# Patient Record
Sex: MALE | Race: WHITE | NOT HISPANIC OR LATINO | Employment: OTHER | URBAN - METROPOLITAN AREA
[De-identification: names, ages, dates, MRNs, and addresses within clinical notes are randomized per-mention and may not be internally consistent; named-entity substitution may affect disease eponyms.]

---

## 2017-11-08 ENCOUNTER — CONTACT LENS EXAM ESTABLISHED (OUTPATIENT)
Dept: URBAN - METROPOLITAN AREA CLINIC 35 | Facility: CLINIC | Age: 72
End: 2017-11-08

## 2017-11-08 DIAGNOSIS — H35.363: ICD-10-CM

## 2017-11-08 DIAGNOSIS — H25.13: ICD-10-CM

## 2017-11-08 PROCEDURE — 92014 COMPRE OPH EXAM EST PT 1/>: CPT

## 2017-11-08 PROCEDURE — 92310-1 LEVEL 1 CONTACT LENS MANAGEMENT

## 2017-11-08 PROCEDURE — 92250 FUNDUS PHOTOGRAPHY W/I&R: CPT

## 2017-11-08 PROCEDURE — G8428 CUR MEDS NOT DOCUMENT: HCPCS

## 2017-11-08 PROCEDURE — 1036F TOBACCO NON-USER: CPT

## 2017-11-08 PROCEDURE — 92015 DETERMINE REFRACTIVE STATE: CPT

## 2017-11-08 ASSESSMENT — TONOMETRY
OS_IOP_MMHG: 16
OD_IOP_MMHG: 18

## 2017-11-08 ASSESSMENT — VISUAL ACUITY
OS_CC: 20/30
OD_SC: J1
OS_SC: J1
OD_CC: 20/40

## 2018-03-23 NOTE — PATIENT DISCUSSION
Corneal Ulcer Counseling: The nature of the diagnosis was discussed with the patient, including the risk of endophthalmitis, corneal scar, blindness, infection, loss of the eye, eye pain, and need for surgical intervention. I have stressed the importance of staying out of any contacts. I also stressed compliance with treatment and follow-up instructions. The patient was instructed to call back and return for care immediately if symptoms worsen.

## 2018-03-23 NOTE — PATIENT DISCUSSION
LARGE CENTRAL CORNEAL ULCER OS - INJURY (DIRTY INSULATION) HAPPENED 2 DAYS AGO, APPEARS TO BE NON-HEALING.  WILL NEED FORTIFIED ANTIBIOTICS - REFER TO [facility] - DIRECTIONS GIVEN TO ER

## 2018-08-01 ENCOUNTER — EST. PATIENT EMERGENCY (OUTPATIENT)
Dept: URBAN - METROPOLITAN AREA CLINIC 35 | Facility: CLINIC | Age: 73
End: 2018-08-01

## 2018-08-01 DIAGNOSIS — H35.363: ICD-10-CM

## 2018-08-01 DIAGNOSIS — H01.006: ICD-10-CM

## 2018-08-01 DIAGNOSIS — H01.003: ICD-10-CM

## 2018-08-01 DIAGNOSIS — H00.026: ICD-10-CM

## 2018-08-01 DIAGNOSIS — H00.023: ICD-10-CM

## 2018-08-01 PROCEDURE — 92012 INTRM OPH EXAM EST PATIENT: CPT

## 2018-08-01 RX ORDER — NEOMYCIN SULFATE, POLYMYXIN B SULFATE AND DEXAMETHASONE 3.5; 10000; 1 MG/G; [USP'U]/G; MG/G: OINTMENT OPHTHALMIC EVERY EVENING

## 2018-08-01 RX ORDER — NEOMYCIN SULFATE, POLYMYXIN B SULFATE AND DEXAMETHASONE 3.5; 10000; 1 MG/ML; [USP'U]/ML; MG/ML: 1 SUSPENSION OPHTHALMIC

## 2018-08-01 ASSESSMENT — VISUAL ACUITY
OS_SC: J3
OS_SC: 20/25
OD_SC: 20/25
OD_SC: J2-

## 2018-08-01 ASSESSMENT — TONOMETRY
OS_IOP_MMHG: 12
OD_IOP_MMHG: 12

## 2019-01-03 ENCOUNTER — CONTACT LENS EXAM ESTABLISHED (OUTPATIENT)
Dept: URBAN - METROPOLITAN AREA CLINIC 35 | Facility: CLINIC | Age: 74
End: 2019-01-03

## 2019-01-03 DIAGNOSIS — H04.123: ICD-10-CM

## 2019-01-03 DIAGNOSIS — H35.363: ICD-10-CM

## 2019-01-03 DIAGNOSIS — H25.13: ICD-10-CM

## 2019-01-03 DIAGNOSIS — H02.883: ICD-10-CM

## 2019-01-03 DIAGNOSIS — H02.886: ICD-10-CM

## 2019-01-03 PROCEDURE — 92310-1 LEVEL 1 CONTACT LENS MANAGEMENT

## 2019-01-03 PROCEDURE — 92015 DETERMINE REFRACTIVE STATE: CPT

## 2019-01-03 PROCEDURE — 92014 COMPRE OPH EXAM EST PT 1/>: CPT

## 2019-01-03 ASSESSMENT — VISUAL ACUITY
OS_CC: J3-
OD_CC: J1-
OS_CC: 20/30-2
OD_CC: 20/20-1

## 2019-01-03 ASSESSMENT — TONOMETRY
OS_IOP_MMHG: 16
OD_IOP_MMHG: 16

## 2019-07-10 ENCOUNTER — REFRACTION ONLY (OUTPATIENT)
Dept: URBAN - METROPOLITAN AREA CLINIC 35 | Facility: CLINIC | Age: 74
End: 2019-07-10

## 2019-07-10 DIAGNOSIS — H01.006: ICD-10-CM

## 2019-07-10 DIAGNOSIS — H25.13: ICD-10-CM

## 2019-07-10 DIAGNOSIS — H04.123: ICD-10-CM

## 2019-07-10 DIAGNOSIS — H01.003: ICD-10-CM

## 2019-07-10 PROCEDURE — 92012 INTRM OPH EXAM EST PATIENT: CPT

## 2019-07-10 ASSESSMENT — VISUAL ACUITY
OD_CC: 20/25 W CLS
OS_CC: 20/20 W CLS

## 2020-07-09 ENCOUNTER — CONTACT LENS EXAM ESTABLISHED (OUTPATIENT)
Dept: URBAN - METROPOLITAN AREA CLINIC 35 | Facility: CLINIC | Age: 75
End: 2020-07-09

## 2020-07-09 DIAGNOSIS — H35.363: ICD-10-CM

## 2020-07-09 DIAGNOSIS — H04.123: ICD-10-CM

## 2020-07-09 DIAGNOSIS — H52.203: ICD-10-CM

## 2020-07-09 DIAGNOSIS — H52.4: ICD-10-CM

## 2020-07-09 DIAGNOSIS — H01.003: ICD-10-CM

## 2020-07-09 DIAGNOSIS — H25.13: ICD-10-CM

## 2020-07-09 DIAGNOSIS — H01.006: ICD-10-CM

## 2020-07-09 DIAGNOSIS — H52.13: ICD-10-CM

## 2020-07-09 PROCEDURE — 92250 FUNDUS PHOTOGRAPHY W/I&R: CPT

## 2020-07-09 PROCEDURE — 92015 DETERMINE REFRACTIVE STATE: CPT

## 2020-07-09 PROCEDURE — 92014 COMPRE OPH EXAM EST PT 1/>: CPT

## 2020-07-09 ASSESSMENT — TONOMETRY
OD_IOP_MMHG: 16
OS_IOP_MMHG: 15

## 2020-07-09 ASSESSMENT — KERATOMETRY
OD_K1POWER_DIOPTERS: 45.25
OS_K1POWER_DIOPTERS: 45
OS_AXISANGLE2_DEGREES: 53
OS_K2POWER_DIOPTERS: 44.5
OS_AXISANGLE_DEGREES: 143
OD_AXISANGLE2_DEGREES: 53
OD_K2POWER_DIOPTERS: 45.5
OD_AXISANGLE_DEGREES: 143

## 2020-07-09 ASSESSMENT — VISUAL ACUITY
OD_CC: 20/25 CL
OS_CC: 20/25-2 CL
OU_CC: 20/25-1 CL
OU_CC: J2 CLS

## 2021-01-07 ENCOUNTER — COSMETIC CONSULT (OUTPATIENT)
Dept: URBAN - METROPOLITAN AREA CLINIC 44 | Facility: CLINIC | Age: 76
End: 2021-01-07

## 2021-01-07 DIAGNOSIS — Z98.890: ICD-10-CM

## 2021-01-07 DIAGNOSIS — L98.8: ICD-10-CM

## 2021-01-07 PROCEDURE — 96999RH4 REVANCE RESILIENT HYALURONIC ACID 4

## 2021-01-07 PROCEDURE — 96999RH3 REVANCE RESILIENT HYALURONIC ACID 3

## 2021-01-07 PROCEDURE — 96999RH2 REVANCE RESILIENT HYALURONIC ACID 2

## 2021-01-07 PROCEDURE — 64612X XEOMIN / COSMETIC

## 2021-01-07 PROCEDURE — 99499 UNLISTED E&M SERVICE: CPT

## 2021-01-07 ASSESSMENT — KERATOMETRY
OS_AXISANGLE2_DEGREES: 53
OD_AXISANGLE2_DEGREES: 53
OS_AXISANGLE_DEGREES: 143
OS_K1POWER_DIOPTERS: 45
OD_AXISANGLE_DEGREES: 143
OS_K2POWER_DIOPTERS: 44.5
OD_K2POWER_DIOPTERS: 45.5
OD_K1POWER_DIOPTERS: 45.25

## 2021-07-14 ENCOUNTER — CONTACT LENS EXAM ESTABLISHED (OUTPATIENT)
Dept: URBAN - METROPOLITAN AREA CLINIC 35 | Facility: CLINIC | Age: 76
End: 2021-07-14

## 2021-07-14 DIAGNOSIS — Z98.890: ICD-10-CM

## 2021-07-14 DIAGNOSIS — H01.006: ICD-10-CM

## 2021-07-14 DIAGNOSIS — Z46.0: ICD-10-CM

## 2021-07-14 DIAGNOSIS — H25.13: ICD-10-CM

## 2021-07-14 DIAGNOSIS — H52.4: ICD-10-CM

## 2021-07-14 DIAGNOSIS — H52.13: ICD-10-CM

## 2021-07-14 DIAGNOSIS — H01.003: ICD-10-CM

## 2021-07-14 DIAGNOSIS — H35.363: ICD-10-CM

## 2021-07-14 PROCEDURE — 92310-1 LEVEL 1 CONTACT LENS MANAGEMENT

## 2021-07-14 PROCEDURE — 92250 FUNDUS PHOTOGRAPHY W/I&R: CPT

## 2021-07-14 PROCEDURE — 92015 DETERMINE REFRACTIVE STATE: CPT

## 2021-07-14 PROCEDURE — 92014 COMPRE OPH EXAM EST PT 1/>: CPT

## 2021-07-14 ASSESSMENT — KERATOMETRY
OS_K1POWER_DIOPTERS: 45
OD_AXISANGLE2_DEGREES: 53
OS_AXISANGLE2_DEGREES: 53
OD_K2POWER_DIOPTERS: 45.5
OD_AXISANGLE_DEGREES: 143
OS_K2POWER_DIOPTERS: 44.5
OD_K1POWER_DIOPTERS: 45.25
OS_AXISANGLE_DEGREES: 143

## 2021-07-14 ASSESSMENT — VISUAL ACUITY
OU_CC: J3 CL
OS_CC: 20/40 CL
OD_CC: J8 CL
OS_CC: J3 CL
OD_CC: 20/25 CL
OU_CC: 20/20 CL

## 2021-07-14 ASSESSMENT — TONOMETRY
OS_IOP_MMHG: 16
OD_IOP_MMHG: 15

## 2022-07-20 ENCOUNTER — COMPREHENSIVE EXAM (OUTPATIENT)
Dept: URBAN - METROPOLITAN AREA CLINIC 35 | Facility: CLINIC | Age: 77
End: 2022-07-20

## 2022-07-20 DIAGNOSIS — H35.363: ICD-10-CM

## 2022-07-20 DIAGNOSIS — H52.4: ICD-10-CM

## 2022-07-20 DIAGNOSIS — H25.13: ICD-10-CM

## 2022-07-20 PROCEDURE — 92310-1 LEVEL 1 CONTACT LENS MANAGEMENT

## 2022-07-20 PROCEDURE — 92250 FUNDUS PHOTOGRAPHY W/I&R: CPT

## 2022-07-20 PROCEDURE — 92015 DETERMINE REFRACTIVE STATE: CPT

## 2022-07-20 PROCEDURE — 92014 COMPRE OPH EXAM EST PT 1/>: CPT

## 2022-07-20 ASSESSMENT — KERATOMETRY
OD_K1POWER_DIOPTERS: 45.25
OD_AXISANGLE_DEGREES: 143
OS_AXISANGLE2_DEGREES: 53
OD_AXISANGLE2_DEGREES: 53
OD_K2POWER_DIOPTERS: 45.5
OS_K1POWER_DIOPTERS: 45
OS_K2POWER_DIOPTERS: 44.5
OS_AXISANGLE_DEGREES: 143

## 2022-07-20 ASSESSMENT — VISUAL ACUITY
OS_CC: J4
OS_CC: 20/30
OD_PH: 20/25+1
OU_CC: 20/25-1
OD_CC: J2
OD_CC: 20/40+2
OU_CC: J1

## 2022-07-20 ASSESSMENT — TONOMETRY
OD_IOP_MMHG: 15
OS_IOP_MMHG: 15

## 2023-05-10 ENCOUNTER — FOLLOW UP (OUTPATIENT)
Dept: URBAN - METROPOLITAN AREA CLINIC 39 | Facility: CLINIC | Age: 78
End: 2023-05-10

## 2023-05-10 DIAGNOSIS — Z98.890: ICD-10-CM

## 2023-05-10 DIAGNOSIS — R22.0: ICD-10-CM

## 2023-05-10 PROCEDURE — 99024 POSTOP FOLLOW-UP VISIT: CPT

## 2023-05-10 ASSESSMENT — KERATOMETRY
OS_K1POWER_DIOPTERS: 45
OD_K1POWER_DIOPTERS: 45.25
OS_K2POWER_DIOPTERS: 44.5
OD_K2POWER_DIOPTERS: 45.5
OS_AXISANGLE2_DEGREES: 53
OD_AXISANGLE_DEGREES: 143
OS_AXISANGLE_DEGREES: 143
OD_AXISANGLE2_DEGREES: 53

## 2023-07-20 ENCOUNTER — COMPREHENSIVE EXAM (OUTPATIENT)
Dept: URBAN - METROPOLITAN AREA CLINIC 35 | Facility: CLINIC | Age: 78
End: 2023-07-20

## 2023-07-20 DIAGNOSIS — H35.3131: ICD-10-CM

## 2023-07-20 DIAGNOSIS — H35.363: ICD-10-CM

## 2023-07-20 DIAGNOSIS — H25.813: ICD-10-CM

## 2023-07-20 PROCEDURE — 92014 COMPRE OPH EXAM EST PT 1/>: CPT

## 2023-07-20 ASSESSMENT — VISUAL ACUITY
OD_CC: 20/30
OU_CC: J4
OS_CC: 20/30

## 2023-07-20 ASSESSMENT — TONOMETRY
OD_IOP_MMHG: 14
OS_IOP_MMHG: 15

## 2023-07-20 ASSESSMENT — KERATOMETRY
OS_AXISANGLE2_DEGREES: 53
OD_K1POWER_DIOPTERS: 45.25
OD_K2POWER_DIOPTERS: 45.5
OS_K2POWER_DIOPTERS: 44.5
OD_AXISANGLE2_DEGREES: 53
OD_AXISANGLE_DEGREES: 143
OS_AXISANGLE_DEGREES: 143
OS_K1POWER_DIOPTERS: 45

## 2023-07-31 ENCOUNTER — TECH ONLY (OUTPATIENT)
Dept: URBAN - METROPOLITAN AREA CLINIC 35 | Facility: CLINIC | Age: 78
End: 2023-07-31

## 2023-07-31 DIAGNOSIS — H25.813: ICD-10-CM

## 2023-07-31 DIAGNOSIS — L98.8: ICD-10-CM

## 2023-07-31 DIAGNOSIS — H04.123: ICD-10-CM

## 2023-07-31 DIAGNOSIS — H35.3131: ICD-10-CM

## 2023-07-31 DIAGNOSIS — H52.7: ICD-10-CM

## 2023-07-31 DIAGNOSIS — H35.363: ICD-10-CM

## 2023-07-31 PROCEDURE — 99211T TECH SERVICE

## 2023-07-31 ASSESSMENT — KERATOMETRY
OS_K2POWER_DIOPTERS: 44.5
OD_AXISANGLE2_DEGREES: 53
OS_AXISANGLE2_DEGREES: 53
OD_K1POWER_DIOPTERS: 45.25
OD_AXISANGLE_DEGREES: 143
OS_AXISANGLE_DEGREES: 143
OS_K1POWER_DIOPTERS: 45
OD_K2POWER_DIOPTERS: 45.5

## 2023-10-24 ENCOUNTER — CONSULTATION/EVALUATION (OUTPATIENT)
Dept: URBAN - METROPOLITAN AREA CLINIC 35 | Facility: CLINIC | Age: 78
End: 2023-10-24

## 2023-10-24 DIAGNOSIS — H35.3131: ICD-10-CM

## 2023-10-24 DIAGNOSIS — H25.813: ICD-10-CM

## 2023-10-24 DIAGNOSIS — H02.886: ICD-10-CM

## 2023-10-24 DIAGNOSIS — H02.883: ICD-10-CM

## 2023-10-24 DIAGNOSIS — H04.123: ICD-10-CM

## 2023-10-24 PROCEDURE — 92025 CPTRIZED CORNEAL TOPOGRAPHY: CPT

## 2023-10-24 PROCEDURE — 92014 COMPRE OPH EXAM EST PT 1/>: CPT

## 2023-10-24 PROCEDURE — 92136 OPHTHALMIC BIOMETRY: CPT

## 2023-10-24 ASSESSMENT — VISUAL ACUITY
OD_CC: J2
OD_CC: 20/20
OD_SC: J2
OS_CC: 20/25
OS_SC: 20/200
OS_CC: J2
OS_BAT: 20/100
OD_BAT: 20/100
OS_SC: J1

## 2023-10-24 ASSESSMENT — KERATOMETRY
OS_AXISANGLE2_DEGREES: 53
OS_K1POWER_DIOPTERS: 45
OD_K1POWER_DIOPTERS: 45.25
OD_AXISANGLE2_DEGREES: 53
OS_AXISANGLE_DEGREES: 143
OS_K2POWER_DIOPTERS: 44.5
OD_AXISANGLE_DEGREES: 143
OD_K2POWER_DIOPTERS: 45.5

## 2023-10-24 ASSESSMENT — TONOMETRY
OS_IOP_MMHG: 12
OD_IOP_MMHG: 12

## 2023-10-25 ENCOUNTER — FOLLOW UP (OUTPATIENT)
Dept: URBAN - METROPOLITAN AREA CLINIC 35 | Facility: CLINIC | Age: 78
End: 2023-10-25

## 2023-10-25 DIAGNOSIS — H25.813: ICD-10-CM

## 2023-10-25 PROCEDURE — 92310AV CL MGMNT ADVANCED VISION TRIAL ALL INCL

## 2023-10-25 ASSESSMENT — VISUAL ACUITY
OD_CC: 20/25
OS_CC: J3
OD_CC: J2
OU_CC: J2
OS_CC: 20/25
OU_CC: 20/25

## 2023-10-25 ASSESSMENT — KERATOMETRY
OS_K2POWER_DIOPTERS: 44.75
OD_K2POWER_DIOPTERS: 45.25
OD_K1POWER_DIOPTERS: 44.75
OD_AXISANGLE_DEGREES: 115
OS_AXISANGLE2_DEGREES: 55
OS_AXISANGLE_DEGREES: 145
OD_AXISANGLE2_DEGREES: 25
OS_K1POWER_DIOPTERS: 44.25

## 2023-11-07 ENCOUNTER — CONSULTATION/EVALUATION (OUTPATIENT)
Dept: URBAN - METROPOLITAN AREA CLINIC 39 | Facility: CLINIC | Age: 78
End: 2023-11-07

## 2023-11-07 DIAGNOSIS — H02.886: ICD-10-CM

## 2023-11-07 DIAGNOSIS — H04.123: ICD-10-CM

## 2023-11-07 DIAGNOSIS — H35.363: ICD-10-CM

## 2023-11-07 DIAGNOSIS — H02.883: ICD-10-CM

## 2023-11-07 DIAGNOSIS — H35.3131: ICD-10-CM

## 2023-11-07 DIAGNOSIS — H25.813: ICD-10-CM

## 2023-11-07 PROCEDURE — 99214 OFFICE O/P EST MOD 30 MIN: CPT

## 2023-11-07 ASSESSMENT — VISUAL ACUITY
OS_CC: J3
OD_RAM: 20/20
OS_SC: 20/200
OD_SC: CF 1FT
OS_SC: J1
OD_BAT: 20/100
OS_BAT: 20/100
OD_SC: J2
OD_CC: J1
OS_AM: 20/20
OS_CC: 20/25
OD_CC: 20/80

## 2023-11-14 ENCOUNTER — CONTACT LENSES/GLASSES VISIT (OUTPATIENT)
Dept: URBAN - METROPOLITAN AREA CLINIC 35 | Facility: CLINIC | Age: 78
End: 2023-11-14

## 2023-11-14 DIAGNOSIS — H35.3131: ICD-10-CM

## 2023-11-14 DIAGNOSIS — H25.813: ICD-10-CM

## 2023-11-14 DIAGNOSIS — H35.363: ICD-10-CM

## 2023-11-14 DIAGNOSIS — H04.123: ICD-10-CM

## 2023-11-14 PROCEDURE — 92012 INTRM OPH EXAM EST PATIENT: CPT

## 2023-11-14 ASSESSMENT — VISUAL ACUITY
OU_CC: 20/30
OS_CC: J8
OS_CC: 20/30-2
OU_CC: J1
OD_CC: J1
OD_CC: 20/100-1

## 2023-11-20 ENCOUNTER — CONSULTATION/EVALUATION (OUTPATIENT)
Dept: URBAN - METROPOLITAN AREA CLINIC 39 | Facility: CLINIC | Age: 78
End: 2023-11-20

## 2023-11-20 DIAGNOSIS — H35.3131: ICD-10-CM

## 2023-11-20 DIAGNOSIS — H35.363: ICD-10-CM

## 2023-11-20 DIAGNOSIS — H25.811: ICD-10-CM

## 2023-11-20 DIAGNOSIS — H02.883: ICD-10-CM

## 2023-11-20 DIAGNOSIS — H35.30: ICD-10-CM

## 2023-11-20 DIAGNOSIS — H25.813: ICD-10-CM

## 2023-11-20 DIAGNOSIS — H04.123: ICD-10-CM

## 2023-11-20 DIAGNOSIS — H02.886: ICD-10-CM

## 2023-11-20 PROCEDURE — 92250 FUNDUS PHOTOGRAPHY W/I&R: CPT

## 2023-11-20 PROCEDURE — 99214 OFFICE O/P EST MOD 30 MIN: CPT

## 2023-11-20 ASSESSMENT — TONOMETRY
OS_IOP_MMHG: 12
OD_IOP_MMHG: 13

## 2023-11-20 ASSESSMENT — VISUAL ACUITY
OD_CC: J1
OS_CC: 20/30

## 2023-12-01 ENCOUNTER — CONSULTATION/EVALUATION (OUTPATIENT)
Dept: URBAN - METROPOLITAN AREA CLINIC 35 | Facility: CLINIC | Age: 78
End: 2023-12-01

## 2023-12-01 DIAGNOSIS — H02.883: ICD-10-CM

## 2023-12-01 DIAGNOSIS — H35.3131: ICD-10-CM

## 2023-12-01 DIAGNOSIS — H35.363: ICD-10-CM

## 2023-12-01 DIAGNOSIS — H25.813: ICD-10-CM

## 2023-12-01 DIAGNOSIS — H02.886: ICD-10-CM

## 2023-12-01 DIAGNOSIS — H04.123: ICD-10-CM

## 2023-12-01 PROCEDURE — 99211T TECH SERVICE

## 2023-12-07 ENCOUNTER — SURGERY/PROCEDURE (OUTPATIENT)
Dept: URBAN - METROPOLITAN AREA SURGERY 14 | Facility: SURGERY | Age: 78
End: 2023-12-07

## 2023-12-07 DIAGNOSIS — H25.813: ICD-10-CM

## 2023-12-07 PROCEDURE — 65772LRI LRI DURING CAT SX

## 2023-12-07 PROCEDURE — 99199PCV PROF CUSTOM VISION PACKAGE

## 2023-12-07 PROCEDURE — 66984CV REMOVE CATARACT, INSERT LENS, CUSTOM VISION

## 2023-12-07 PROCEDURE — 66999LNSR LENSAR LASER FOR CAT SX

## 2023-12-08 ENCOUNTER — POST-OP (OUTPATIENT)
Dept: URBAN - METROPOLITAN AREA CLINIC 39 | Facility: CLINIC | Age: 78
End: 2023-12-08

## 2023-12-08 DIAGNOSIS — H02.886: ICD-10-CM

## 2023-12-08 DIAGNOSIS — H02.883: ICD-10-CM

## 2023-12-08 DIAGNOSIS — H04.123: ICD-10-CM

## 2023-12-08 DIAGNOSIS — H25.811: ICD-10-CM

## 2023-12-08 DIAGNOSIS — H25.812: ICD-10-CM

## 2023-12-08 DIAGNOSIS — H35.363: ICD-10-CM

## 2023-12-08 DIAGNOSIS — H35.3131: ICD-10-CM

## 2023-12-08 DIAGNOSIS — Z96.1: ICD-10-CM

## 2023-12-08 ASSESSMENT — VISUAL ACUITY
OD_SC: J1
OD_PH: 20/20
OD_SC: 20/50

## 2023-12-08 ASSESSMENT — TONOMETRY: OD_IOP_MMHG: 26

## 2023-12-12 ENCOUNTER — POST-OP (OUTPATIENT)
Dept: URBAN - METROPOLITAN AREA CLINIC 35 | Facility: CLINIC | Age: 78
End: 2023-12-12

## 2023-12-12 DIAGNOSIS — Z96.1: ICD-10-CM

## 2023-12-12 DIAGNOSIS — H25.811: ICD-10-CM

## 2023-12-12 PROCEDURE — 92012 INTRM OPH EXAM EST PATIENT: CPT

## 2023-12-12 ASSESSMENT — VISUAL ACUITY
OD_SC: 20/50-1
OD_SC: J1

## 2023-12-12 ASSESSMENT — TONOMETRY: OD_IOP_MMHG: 14

## 2023-12-14 ENCOUNTER — SURGERY/PROCEDURE (OUTPATIENT)
Dept: URBAN - METROPOLITAN AREA SURGERY 14 | Facility: SURGERY | Age: 78
End: 2023-12-14

## 2023-12-14 DIAGNOSIS — H25.813: ICD-10-CM

## 2023-12-14 PROCEDURE — 99199PCV PROF CUSTOM VISION PACKAGE

## 2023-12-14 PROCEDURE — 66999LNSR LENSAR LASER FOR CAT SX

## 2023-12-14 PROCEDURE — 65772LRI LRI DURING CAT SX

## 2023-12-14 PROCEDURE — 66984CV REMOVE CATARACT, INSERT LENS, CUSTOM VISION

## 2023-12-15 ENCOUNTER — POST-OP (OUTPATIENT)
Dept: URBAN - METROPOLITAN AREA CLINIC 35 | Facility: CLINIC | Age: 78
End: 2023-12-15

## 2023-12-15 DIAGNOSIS — Z96.1: ICD-10-CM

## 2023-12-15 DIAGNOSIS — H35.363: ICD-10-CM

## 2023-12-15 DIAGNOSIS — H04.123: ICD-10-CM

## 2023-12-15 DIAGNOSIS — H02.886: ICD-10-CM

## 2023-12-15 DIAGNOSIS — H35.3131: ICD-10-CM

## 2023-12-15 DIAGNOSIS — H02.883: ICD-10-CM

## 2023-12-15 ASSESSMENT — TONOMETRY: OS_IOP_MMHG: 28

## 2023-12-15 ASSESSMENT — VISUAL ACUITY: OS_SC: 20/30-1

## 2023-12-18 ENCOUNTER — POST-OP (OUTPATIENT)
Dept: URBAN - METROPOLITAN AREA CLINIC 35 | Facility: CLINIC | Age: 78
End: 2023-12-18

## 2023-12-18 DIAGNOSIS — H04.123: ICD-10-CM

## 2023-12-18 DIAGNOSIS — Z96.1: ICD-10-CM

## 2023-12-18 PROCEDURE — 99024 POSTOP FOLLOW-UP VISIT: CPT

## 2023-12-18 ASSESSMENT — VISUAL ACUITY
OS_SC: 20/20
OD_SC: 20/200
OS_SC: J3
OD_SC: J1

## 2023-12-18 ASSESSMENT — TONOMETRY
OS_IOP_MMHG: 14
OD_IOP_MMHG: 16

## 2024-01-22 ENCOUNTER — POST-OP (OUTPATIENT)
Dept: URBAN - METROPOLITAN AREA CLINIC 35 | Facility: CLINIC | Age: 79
End: 2024-01-22

## 2024-01-22 DIAGNOSIS — Z96.1: ICD-10-CM

## 2024-01-22 PROCEDURE — 99024 POSTOP FOLLOW-UP VISIT: CPT

## 2024-01-22 ASSESSMENT — VISUAL ACUITY
OS_SC: 20/20
OU_SC: J1
OU_SC: 20/20
OD_SC: J1
OD_SC: 20/80+1
OS_SC: J3

## 2024-01-22 ASSESSMENT — TONOMETRY
OD_IOP_MMHG: 10
OS_IOP_MMHG: 10

## 2024-02-22 ENCOUNTER — EMERGENCY VISIT (OUTPATIENT)
Dept: URBAN - METROPOLITAN AREA CLINIC 35 | Facility: CLINIC | Age: 79
End: 2024-02-22

## 2024-02-22 DIAGNOSIS — H18.593: ICD-10-CM

## 2024-02-22 DIAGNOSIS — H16.141: ICD-10-CM

## 2024-02-22 PROCEDURE — 99213 OFFICE O/P EST LOW 20 MIN: CPT

## 2024-02-22 ASSESSMENT — TONOMETRY
OD_IOP_MMHG: 16
OS_IOP_MMHG: 14

## 2024-02-22 ASSESSMENT — VISUAL ACUITY
OS_SC: J4-
OD_SC: J1
OS_SC: 20/20
OU_SC: 20/20
OU_SC: J1
OD_SC: 20/30

## 2024-04-09 ENCOUNTER — EMERGENCY VISIT (OUTPATIENT)
Dept: URBAN - METROPOLITAN AREA CLINIC 39 | Facility: CLINIC | Age: 79
End: 2024-04-09

## 2024-04-09 DIAGNOSIS — H01.026: ICD-10-CM

## 2024-04-09 DIAGNOSIS — H02.883: ICD-10-CM

## 2024-04-09 DIAGNOSIS — H02.886: ICD-10-CM

## 2024-04-09 DIAGNOSIS — H04.123: ICD-10-CM

## 2024-04-09 DIAGNOSIS — H01.023: ICD-10-CM

## 2024-04-09 PROCEDURE — 92012 INTRM OPH EXAM EST PATIENT: CPT

## 2024-04-09 ASSESSMENT — TONOMETRY
OD_IOP_MMHG: 17
OS_IOP_MMHG: 17

## 2024-04-09 ASSESSMENT — VISUAL ACUITY
OD_SC: 20/60-2
OU_SC: 20/20
OS_SC: 20/20

## 2024-04-29 ENCOUNTER — FOLLOW UP (OUTPATIENT)
Dept: URBAN - METROPOLITAN AREA CLINIC 35 | Facility: CLINIC | Age: 79
End: 2024-04-29

## 2024-04-29 DIAGNOSIS — H26.493: ICD-10-CM

## 2024-04-29 PROCEDURE — 99213 OFFICE O/P EST LOW 20 MIN: CPT

## 2024-04-29 ASSESSMENT — TONOMETRY
OS_IOP_MMHG: 10
OD_IOP_MMHG: 14

## 2024-04-29 ASSESSMENT — VISUAL ACUITY
OD_SC: J1
OD_SC: 20/70
OS_SC: J4
OS_SC: 20/20-1

## 2024-06-24 ENCOUNTER — FOLLOW UP (OUTPATIENT)
Dept: URBAN - METROPOLITAN AREA CLINIC 40 | Facility: CLINIC | Age: 79
End: 2024-06-24

## 2024-06-24 DIAGNOSIS — H02.883: ICD-10-CM

## 2024-06-24 DIAGNOSIS — H01.026: ICD-10-CM

## 2024-06-24 DIAGNOSIS — H02.886: ICD-10-CM

## 2024-06-24 DIAGNOSIS — H01.023: ICD-10-CM

## 2024-06-24 PROCEDURE — 92012 INTRM OPH EXAM EST PATIENT: CPT

## 2024-06-24 ASSESSMENT — VISUAL ACUITY
OU_SC: 20/20
OS_SC: 20/20
OD_SC: 20/50-2

## 2024-06-24 ASSESSMENT — TONOMETRY
OS_IOP_MMHG: 13
OD_IOP_MMHG: 14

## 2024-09-30 ENCOUNTER — CONSULTATION/EVALUATION (OUTPATIENT)
Dept: URBAN - METROPOLITAN AREA CLINIC 35 | Facility: CLINIC | Age: 79
End: 2024-09-30

## 2024-09-30 DIAGNOSIS — H02.886: ICD-10-CM

## 2024-09-30 DIAGNOSIS — H01.026: ICD-10-CM

## 2024-09-30 DIAGNOSIS — H43.811: ICD-10-CM

## 2024-09-30 DIAGNOSIS — H26.493: ICD-10-CM

## 2024-09-30 DIAGNOSIS — H01.023: ICD-10-CM

## 2024-09-30 DIAGNOSIS — H02.883: ICD-10-CM

## 2024-09-30 PROCEDURE — 99203 OFFICE O/P NEW LOW 30 MIN: CPT

## 2024-10-24 ENCOUNTER — COMPREHENSIVE EXAM (OUTPATIENT)
Dept: URBAN - METROPOLITAN AREA CLINIC 35 | Facility: CLINIC | Age: 79
End: 2024-10-24

## 2024-10-24 DIAGNOSIS — H43.813: ICD-10-CM

## 2024-10-24 DIAGNOSIS — H01.023: ICD-10-CM

## 2024-10-24 DIAGNOSIS — H35.09: ICD-10-CM

## 2024-10-24 DIAGNOSIS — H01.026: ICD-10-CM

## 2024-10-24 DIAGNOSIS — H35.033: ICD-10-CM

## 2024-10-24 DIAGNOSIS — H35.3132: ICD-10-CM

## 2024-10-24 DIAGNOSIS — H35.363: ICD-10-CM

## 2024-10-24 DIAGNOSIS — H53.8: ICD-10-CM

## 2024-10-24 DIAGNOSIS — H04.123: ICD-10-CM

## 2024-10-24 PROCEDURE — 99214 OFFICE O/P EST MOD 30 MIN: CPT

## 2024-10-24 PROCEDURE — 92250 FUNDUS PHOTOGRAPHY W/I&R: CPT

## 2025-04-21 ENCOUNTER — COMPREHENSIVE EXAM (OUTPATIENT)
Age: 80
End: 2025-04-21

## 2025-04-21 DIAGNOSIS — H35.033: ICD-10-CM

## 2025-04-21 DIAGNOSIS — H26.493: ICD-10-CM

## 2025-04-21 DIAGNOSIS — H35.3132: ICD-10-CM

## 2025-04-21 DIAGNOSIS — H43.391: ICD-10-CM

## 2025-04-21 DIAGNOSIS — H43.813: ICD-10-CM

## 2025-04-21 DIAGNOSIS — H01.026: ICD-10-CM

## 2025-04-21 DIAGNOSIS — H02.886: ICD-10-CM

## 2025-04-21 DIAGNOSIS — H01.023: ICD-10-CM

## 2025-04-21 DIAGNOSIS — R22.0: ICD-10-CM

## 2025-04-21 DIAGNOSIS — H35.09: ICD-10-CM

## 2025-04-21 DIAGNOSIS — H02.883: ICD-10-CM

## 2025-04-21 DIAGNOSIS — H04.123: ICD-10-CM

## 2025-04-21 DIAGNOSIS — H35.363: ICD-10-CM

## 2025-04-21 DIAGNOSIS — H53.8: ICD-10-CM

## 2025-04-21 PROCEDURE — 92014 COMPRE OPH EXAM EST PT 1/>: CPT

## 2025-05-22 ENCOUNTER — CONSULTATION/EVALUATION (OUTPATIENT)
Age: 80
End: 2025-05-22

## 2025-05-22 ENCOUNTER — SURGERY/PROCEDURE (OUTPATIENT)
Age: 80
End: 2025-05-22

## 2025-05-22 DIAGNOSIS — H26.493: ICD-10-CM

## 2025-05-22 PROCEDURE — 6682150 YAG CAPSULOTOMY: Mod: 50

## 2025-05-22 PROCEDURE — 99214 OFFICE O/P EST MOD 30 MIN: CPT | Mod: 57

## 2025-06-02 ENCOUNTER — FOLLOW UP (OUTPATIENT)
Age: 80
End: 2025-06-02

## 2025-06-02 ENCOUNTER — SURGERY/PROCEDURE (OUTPATIENT)
Age: 80
End: 2025-06-02

## 2025-06-02 DIAGNOSIS — H04.123: ICD-10-CM

## 2025-06-02 DIAGNOSIS — L71.9: ICD-10-CM

## 2025-06-02 DIAGNOSIS — H01.023: ICD-10-CM

## 2025-06-02 DIAGNOSIS — H01.026: ICD-10-CM

## 2025-06-02 PROCEDURE — 99213 OFFICE O/P EST LOW 20 MIN: CPT

## 2025-06-05 ENCOUNTER — CLINIC PROCEDURE ONLY (OUTPATIENT)
Age: 80
End: 2025-06-05

## 2025-06-05 DIAGNOSIS — H04.123: ICD-10-CM

## 2025-06-05 PROCEDURE — 99199ST SERUM TEARS
